# Patient Record
(demographics unavailable — no encounter records)

---

## 2025-07-09 NOTE — DEVELOPMENTAL MILESTONES
[Normal] : Developmental history within normal limits [Roll Over: ___ Months] : Roll Over: [unfilled] months [Sit Up: ___ Months] : Sit Up: [unfilled] months [Pull Self to Stand ___ Months] : Pull self to stand: [unfilled] months [Walk ___ Months] : Walk: [unfilled] months [Verbally] : verbally [Right] : right [FreeTextEntry2] : No [FreeTextEntry3] : Left arm sling

## 2025-07-09 NOTE — PHYSICAL EXAM
[FreeTextEntry1] : Alert, comfortable, well-developed, in no apparent distress and well oriented 8-year-old young woman.  She presents with a painless firm mass over her left clavicle.  No breaking of skin or skin tenting.  She is neurovascularly intact with some difficulty with active range of motion as expected.  Rest of the exam of her shoulder and chest within normal limits.

## 2025-07-09 NOTE — ASSESSMENT
[FreeTextEntry1] : Diagnosis: Well-healing left clavicle fracture.  The history was obtained today from the child and parent; given the patient's age and/or the child's mental capacity, the history was unreliable and the parent was used as an independent historian.  This is a healthy 8-year-old young woman almost 2 weeks status post the above diagnosis.  She is doing well.  Mother and patient are informed about the nature of the fracture and what to expect within the next few weeks.  They are also warned about the appearance of the callus formation around the fracture and reassured that it is a normal healing process.  No gym or sports for 2 weeks time.  Follow-up on a as needed basis.  All of the mother's questions were addressed. She understood and agreed with the plan.  This note was generated using Dragon medical dictation software.  A reasonable effort has been made for proofreading its contents, but typos may still remain.  If there are any questions or points of clarification needed please do not hesitate to contact my office.  The time spent with the patient includes my evaluation of imaging studies and/or tests performed by previous doctors or brought by the family.

## 2025-07-09 NOTE — DATA REVIEWED
[de-identified] : X-rays of her left clavicle taken at [facility] emergency department on June 28 as well as new x-rays taken today show a midshaft transverse nondisplaced fracture of her left clavicle.  No changes in the alignment

## 2025-07-09 NOTE — HISTORY OF PRESENT ILLNESS
[FreeTextEntry1] : Florence is an otherwise healthy and active 8-year-old girl brought in by her mother after sustaining an injury to her left shoulder area on June 26. She was seen at [facility] [place] department where x-rays were taken that showed a f left clavicle fracture racture.  She was recommended to use a sling or shoulder immobilizer which she is still wearing.  Overall, she has been feeling better.
